# Patient Record
Sex: MALE | Race: WHITE | NOT HISPANIC OR LATINO | Employment: FULL TIME | URBAN - METROPOLITAN AREA
[De-identification: names, ages, dates, MRNs, and addresses within clinical notes are randomized per-mention and may not be internally consistent; named-entity substitution may affect disease eponyms.]

---

## 2021-05-11 ENCOUNTER — OFFICE VISIT (OUTPATIENT)
Dept: FAMILY MEDICINE CLINIC | Facility: CLINIC | Age: 31
End: 2021-05-11

## 2021-05-11 VITALS
HEIGHT: 71 IN | HEART RATE: 100 BPM | BODY MASS INDEX: 28.14 KG/M2 | OXYGEN SATURATION: 98 % | SYSTOLIC BLOOD PRESSURE: 138 MMHG | TEMPERATURE: 97 F | DIASTOLIC BLOOD PRESSURE: 84 MMHG | WEIGHT: 201 LBS

## 2021-05-11 DIAGNOSIS — E11.9 TYPE 2 DIABETES MELLITUS WITHOUT COMPLICATION, WITHOUT LONG-TERM CURRENT USE OF INSULIN (HCC): ICD-10-CM

## 2021-05-11 DIAGNOSIS — M54.16 LUMBAR RADICULOPATHY: Primary | ICD-10-CM

## 2021-05-11 DIAGNOSIS — R20.8 DECREASED SENSATION OF FOOT: ICD-10-CM

## 2021-05-11 PROCEDURE — 99204 OFFICE O/P NEW MOD 45 MIN: CPT | Performed by: FAMILY MEDICINE

## 2021-05-11 RX ORDER — IBUPROFEN 800 MG/1
800 TABLET ORAL EVERY 8 HOURS PRN
COMMUNITY
Start: 2021-05-07 | End: 2021-05-17 | Stop reason: SDUPTHER

## 2021-05-11 RX ORDER — CYCLOBENZAPRINE HCL 5 MG
1 TABLET ORAL EVERY 8 HOURS PRN
COMMUNITY
Start: 2021-05-07 | End: 2021-05-17 | Stop reason: SDUPTHER

## 2021-05-11 NOTE — PROGRESS NOTES
"Chief Complaint  Establish Care (New pt. C/o pain in back pf right thigh. Right foot is numb. )    Subjective          Godwin Reed presents to [facility]  History of Present Illness  New pt here to get established and follow up of [facility] with muscle strain of right lower leg and was given muscle relaxer and ibu 800 on 5/7/21.  He also had some numbness on his rt foot.  He states sitting bothers him and then standing long time does as well.  US for DVT was normal.    DM- he used to see Dr Lama but not seen him in a long time.  No med for this currently but at one point was on insulin and then came off and then pills and then nothing.  This was done thru diet and exercise.  Works at [employer].    Objective   Vital Signs:   /84   Pulse 100   Temp 97 °F (36.1 °C)   Ht 179.1 cm (70.5\")   Wt 91.2 kg (201 lb)   SpO2 98%   BMI 28.43 kg/m²     Physical Exam  Vitals and nursing note reviewed.   Constitutional:       General: He is not in acute distress.     Appearance: Normal appearance. He is not ill-appearing, toxic-appearing or diaphoretic.   HENT:      Head: Normocephalic and atraumatic.   Cardiovascular:      Rate and Rhythm: Normal rate and regular rhythm.      Heart sounds: Normal heart sounds. No murmur heard.   No friction rub.   Pulmonary:      Effort: Pulmonary effort is normal. No respiratory distress.      Breath sounds: Normal breath sounds. No stridor. No wheezing, rhonchi or rales.   Chest:      Chest wall: No tenderness.   Musculoskeletal:      Comments: Positive straight leg raise test.   Neurological:      Mental Status: He is alert.        Result Review :                 Assessment and Plan    Diagnoses and all orders for this visit:    1. Lumbar radiculopathy (Primary)  -     MRI Lumbar Spine Without Contrast    2. Type 2 diabetes mellitus without complication, without long-term current use of insulin (CMS/[facility])  -     Hemoglobin A1c    3. Decreased " sensation of foot  -     MRI Lumbar Spine Without Contrast        Follow Up   Return in about 3 months (around 8/11/2021) for diabetes.  Patient was given instructions and counseling regarding his condition or for health maintenance advice. Please see specific information pulled into the AVS if appropriate.     Will need a work note and will get labs.  Continue current medication.    Will get MRI and consider PT.  Discussed exercise.  May need FMLA.  Off work for the rest of this week.

## 2021-05-12 LAB — HBA1C MFR BLD: 5.6 % (ref 4.8–5.6)

## 2021-05-17 ENCOUNTER — TELEPHONE (OUTPATIENT)
Dept: FAMILY MEDICINE CLINIC | Facility: CLINIC | Age: 31
End: 2021-05-17

## 2021-05-17 RX ORDER — CYCLOBENZAPRINE HCL 5 MG
5 TABLET ORAL EVERY 8 HOURS PRN
Qty: 30 TABLET | Refills: 0 | Status: SHIPPED | OUTPATIENT
Start: 2021-05-17

## 2021-05-17 RX ORDER — IBUPROFEN 800 MG/1
800 TABLET ORAL 3 TIMES DAILY PRN
Qty: 90 TABLET | Refills: 2 | Status: SHIPPED | OUTPATIENT
Start: 2021-05-17

## 2021-05-17 NOTE — TELEPHONE ENCOUNTER
PATIENT IS OUT OF MEDICATION ibuprofen (ADVIL,MOTRIN) 800 MG tablet    cyclobenzaprine (FLEXERIL) 5 MG tablet    WAS WONDERING IF HE COULD GET A REFILL

## 2021-05-17 NOTE — TELEPHONE ENCOUNTER
Caller: FACUNDO PACE    Relationship to patient: Mother    Best call back number: [phone]    Patient is needing: HE NEEDS A NOTE STATING THAT PATIENT NEEDS TO BE OFF WORK LONGER FOR HIS FOOT. PLEASE CALL MS. PACE OR PATIENT AND ADVISE. MS. PACE SAID SHE WOULD LIKE TO  NOTE WHEN READY AND WILL DROP OFF MRI. PLEASE CALL WHEN NOTE IS READY.

## 2021-05-21 ENCOUNTER — TELEPHONE (OUTPATIENT)
Dept: FAMILY MEDICINE CLINIC | Facility: CLINIC | Age: 31
End: 2021-05-21

## 2021-05-24 ENCOUNTER — PATIENT MESSAGE (OUTPATIENT)
Dept: FAMILY MEDICINE CLINIC | Facility: CLINIC | Age: 31
End: 2021-05-24

## 2021-05-24 ENCOUNTER — TELEPHONE (OUTPATIENT)
Dept: FAMILY MEDICINE CLINIC | Facility: CLINIC | Age: 31
End: 2021-05-24

## 2021-05-24 DIAGNOSIS — M54.16 LUMBAR RADICULOPATHY: Primary | ICD-10-CM

## 2021-05-24 DIAGNOSIS — R20.8 DECREASED SENSATION OF FOOT: ICD-10-CM

## 2021-05-24 DIAGNOSIS — M54.16 LUMBAR RADICULOPATHY: ICD-10-CM

## 2021-05-24 NOTE — TELEPHONE ENCOUNTER
Left message to return call. On pt and mothers VM - she is on consent.    You have some spinal stenosis and narrowing at L5-S1 and L4-5.  I am sending you to a spine specialist for this.    Ok to take 2 more days off work and see how he does.      HUB MAY RELAY MESSAGE TO PATIENT AND RESPOND.    PLEASE REPLY THAT PT HAS BEEN NOTIFIED.

## 2021-05-24 NOTE — PROGRESS NOTES
You have some spinal stenosis and narrowing at L5-S1 and L4-5.  I am sending you to a spine specialist for this.

## 2021-05-24 NOTE — TELEPHONE ENCOUNTER
Caller: FACUNDO PACE    Relationship: Mother    Best call back number: [phone]    Caller requesting test results: MOTHER    What test was performed: MRI    When was the test performed: 5/15/21    Where was the test performed: [facility]    Additional notes: PATIENTS LEG IS WEAK AND TIRED AND HE CANT STAND LONG. SHOULD HE GO BACK TO WORK. SHE ALSO WANTS TO KNOW IF HE SHOULD GO TO PHYSICAL THERAPY OR WHAT HE CAN DO. PLEASE REACH OUT TO MOTHER AND ADVISE

## 2021-05-24 NOTE — TELEPHONE ENCOUNTER
"Caller: FACUNDO PACE    Relationship to patient: Mother    Best call back number: [phone]    Patient is needing:     PATIENT'S MOM, CALLED IN STATING THAT THEY HAVE NOT GOTTEN PATIENTS RESULTS FROM HIS MRI.    THEY DO HAVE QUESTIONS REGARDING A FEW THINGS  PATIENT IS NO LONGER HAVING ANY KIND OF PAIN, BUT DOES GET LEG WEAKNESS AND FEELS LIKE HE'S \" WALKED ALL DAY/RAN\"     HE FEELS THIS WAY AFTER BEING ON HIS FEET FOR ABOUT AN HOUR OR SO. THE NUMBNESS DOES ALMOST GO AWAY WHEN HE SITS OR LAYS DOWN BUT WHEN HE'S UP ON HIS LEG FOR A WHILE IT SORT OF COMES BACK.    DR BLOOM SAID IT WAS LUMBAR RADICULOPATHY, THEY WERE READING UP ON THIS AND KNOW THIS IS SORT OF APART OF THAT.     THEY'RE CURIOUS ABOUT WHETHER OR NOT HE SHOULD START DOING SOME PHYSICAL THERAPY, OR MAYBE SEE A NEUROLOGIST.     HE IS SUPPOSE TO GO BACK TO WORK TONIGHT/TOMORROW AT 1 AM AND THEY'RE CURIOUS IF HE SHOULD EVEN BE GOING BACK TO WORK, OR HAVE SOMETHING THAT SAYS HE NEEDS TO DO SOME LIGHT DUTY WORK, HE'S ON HIS LEGS/ FEET ALL NIGHT, LIFTING HEAVY.      PATIENT IS STILL TAKING THE MUSCLE RELAXER AND THE IBUPROFEN DR BLOOM PRESCRIBE.       PLEASE CALL AND ADVISE UPON SEEING THIS [phone].     "

## 2021-05-24 NOTE — TELEPHONE ENCOUNTER
You have some spinal stenosis and narrowing at L5-S1 and L4-5.  I am sending you to a spine specialist for this.    Ok to take 2 more days off work and see how he does.

## 2021-05-27 ENCOUNTER — TELEPHONE (OUTPATIENT)
Dept: FAMILY MEDICINE CLINIC | Facility: CLINIC | Age: 31
End: 2021-05-27

## 2021-05-27 NOTE — TELEPHONE ENCOUNTER
Caller: FACUNDO PACE    Relationship: Mother    Best call back number: [phone]    What specialty or service is being requested: BRUCE ALVARENGA CAN'T SEE PATIENT UNTIL AUGUST 18.  PATIENT'S MOTHER IS ASKING IF PATIENT CAN WAIT THAT LONG OR IF PROVIDER WOULD PREFER TO CALL ANOTHER SPECIALIST. PATIENT HAS A LITTLE NUMBNESS  IN RIGHT FOOT RIGHT NOW.      PLEASE CALL MOTHER AND ADVISE

## 2021-08-25 RX ORDER — IBUPROFEN 800 MG/1
TABLET ORAL
Qty: 90 TABLET | Refills: 2 | OUTPATIENT
Start: 2021-08-25